# Patient Record
Sex: FEMALE | Race: WHITE | NOT HISPANIC OR LATINO | URBAN - METROPOLITAN AREA
[De-identification: names, ages, dates, MRNs, and addresses within clinical notes are randomized per-mention and may not be internally consistent; named-entity substitution may affect disease eponyms.]

---

## 2019-11-29 ENCOUNTER — EMERGENCY (EMERGENCY)
Facility: HOSPITAL | Age: 49
LOS: 0 days | Discharge: HOME | End: 2019-11-29
Attending: EMERGENCY MEDICINE
Payer: COMMERCIAL

## 2019-11-29 VITALS
HEART RATE: 83 BPM | TEMPERATURE: 98 F | SYSTOLIC BLOOD PRESSURE: 132 MMHG | RESPIRATION RATE: 18 BRPM | DIASTOLIC BLOOD PRESSURE: 69 MMHG | OXYGEN SATURATION: 97 %

## 2019-11-29 VITALS — HEIGHT: 68 IN | WEIGHT: 240.08 LBS

## 2019-11-29 DIAGNOSIS — L05.01 PILONIDAL CYST WITH ABSCESS: ICD-10-CM

## 2019-11-29 DIAGNOSIS — L72.9 FOLLICULAR CYST OF THE SKIN AND SUBCUTANEOUS TISSUE, UNSPECIFIED: ICD-10-CM

## 2019-11-29 PROCEDURE — 99283 EMERGENCY DEPT VISIT LOW MDM: CPT

## 2019-11-29 PROCEDURE — 99282 EMERGENCY DEPT VISIT SF MDM: CPT

## 2019-11-29 NOTE — ED PROVIDER NOTE - SKIN, MLM
Skin normal color for race, warm, dry and intact. No evidence of rash. Between upper buttox redness several cm blus elevation, induration, warmth and pain, no fluctuance, no significant drainage.

## 2019-11-29 NOTE — ED PROVIDER NOTE - PATIENT PORTAL LINK FT
You can access the FollowMyHealth Patient Portal offered by [facility] by registering at the following website: [email or web address]. By joining [facility]’s FollowMyHealth portal, you will also be able to view your health information using other applications (apps) compatible with our system.

## 2019-11-29 NOTE — ED PROVIDER NOTE - CLINICAL SUMMARY MEDICAL DECISION MAKING FREE TEXT BOX
Discuss options of labs vs treament only, pt prefers treatment, no incision at this point, will follow w PMD, we prescribe abx.

## 2019-11-29 NOTE — ED ADULT TRIAGE NOTE - CHIEF COMPLAINT QUOTE
c/o lower mid back abscess for past week, wound open and draining, c/o pain at site and associated chills last night, denies fever

## 2019-11-29 NOTE — ED PROVIDER NOTE - OBJECTIVE STATEMENT
Pt with pain lower back from cyst, had this for several years, now draining. chills yesterday, no fever. this has been going on for 1 week. Hx of defibrillator, MVP, CHF w pregnancy. Takes ASA. No other symtoms.

## 2019-11-29 NOTE — ED ADULT NURSE NOTE - OBJECTIVE STATEMENT
pt is a 49 y/o female presenting to ED for eval of abscess to the low back area w/ slight purulent drainage since last week now w/ chills last night. denies fever, dyspnea, palpitations or weakness.

## 2019-11-29 NOTE — ED ADULT NURSE NOTE - CHPI ED NUR SYMPTOMS NEG
no bleeding at site/no fever/no redness/no vomiting/no blood in mucus/no chills/no pain/no rectal pain

## 2022-07-26 ENCOUNTER — NON-APPOINTMENT (OUTPATIENT)
Age: 52
End: 2022-07-26

## 2022-07-26 ENCOUNTER — APPOINTMENT (OUTPATIENT)
Dept: HEART AND VASCULAR | Facility: CLINIC | Age: 52
End: 2022-07-26

## 2022-07-26 VITALS
OXYGEN SATURATION: 94 % | HEART RATE: 71 BPM | SYSTOLIC BLOOD PRESSURE: 107 MMHG | HEIGHT: 68 IN | WEIGHT: 261 LBS | BODY MASS INDEX: 39.56 KG/M2 | DIASTOLIC BLOOD PRESSURE: 73 MMHG

## 2022-07-26 PROBLEM — Z00.00 ENCOUNTER FOR PREVENTIVE HEALTH EXAMINATION: Status: ACTIVE | Noted: 2022-07-26

## 2022-07-26 PROCEDURE — 93000 ELECTROCARDIOGRAM COMPLETE: CPT | Mod: NC

## 2022-07-26 PROCEDURE — 99204 OFFICE O/P NEW MOD 45 MIN: CPT | Mod: 25

## 2022-07-26 NOTE — DISCUSSION/SUMMARY
[FreeTextEntry1] : 50 y/o F with PMHx of NICM (EF 45%), ICD, MR s/p Mitral valve repair (1999), Paroxysmal SVT, Peripartum CM, HTN\par \par # Preop Evaluation\par RCRI 0. Able to perform > 4 mets without cardiac symptoms\par No contraindication or further cardiac work up needed prior to urgent surgery\par Recommend continuing ASA, BB perioperatively\par Can hold Lasix, Lisinopril on day of surgery\par \par # HFrEF (NICM)\par EF 45%, continue GDMT (BB, ACEi). Can hold ACEi on day of surgery\par ICD in place\par \par # HTN\par BPs at goal continue current meds\par \par # Hx of Mitral valve repair [EKG obtained to assist in diagnosis and management of assessed problem(s)] : EKG obtained to assist in diagnosis and management of assessed problem(s)

## 2022-07-26 NOTE — HISTORY OF PRESENT ILLNESS
[FreeTextEntry1] : 52 y/o F with PMHx of NICM (EF 45%), ICD, MR s/p Mitral valve repair (1999), Paroxysmal SVT, Peripartum CM, HTN\par \par Had TKR 2021, now pre-operative evaluation for infected Rt Knee prosthesis\par \par No active cardiac symptoms\par Able to perform > 4 mets\par Recent trip to [place], no issues with exertion\par No Hx of CAD\par \par Echo 9/2020: EF 45%, mild RV dilation, Mitral annuloplasty ring in place, mild MR, no pHTN\par EKG 7/26/2022: NSR, HR 69, stable from prior\par

## 2022-08-26 ENCOUNTER — NON-APPOINTMENT (OUTPATIENT)
Age: 52
End: 2022-08-26

## 2022-08-29 ENCOUNTER — APPOINTMENT (OUTPATIENT)
Dept: SURGERY | Facility: CLINIC | Age: 52
End: 2022-08-29

## 2022-08-29 ENCOUNTER — RESULT REVIEW (OUTPATIENT)
Age: 52
End: 2022-08-29

## 2022-08-29 VITALS
BODY MASS INDEX: 39.56 KG/M2 | DIASTOLIC BLOOD PRESSURE: 70 MMHG | SYSTOLIC BLOOD PRESSURE: 117 MMHG | WEIGHT: 261 LBS | HEART RATE: 79 BPM | HEIGHT: 68 IN | TEMPERATURE: 97.6 F

## 2022-08-29 DIAGNOSIS — Z86.79 PERSONAL HISTORY OF OTHER DISEASES OF THE CIRCULATORY SYSTEM: ICD-10-CM

## 2022-08-29 DIAGNOSIS — Z86.19 PERSONAL HISTORY OF OTHER INFECTIOUS AND PARASITIC DISEASES: ICD-10-CM

## 2022-08-29 DIAGNOSIS — Z95.810 PRESENCE OF AUTOMATIC (IMPLANTABLE) CARDIAC DEFIBRILLATOR: ICD-10-CM

## 2022-08-29 DIAGNOSIS — Z87.19 PERSONAL HISTORY OF OTHER DISEASES OF THE DIGESTIVE SYSTEM: ICD-10-CM

## 2022-08-29 DIAGNOSIS — Z95.0 PRESENCE OF CARDIAC PACEMAKER: ICD-10-CM

## 2022-08-29 PROCEDURE — 11770 EXC PILONIDAL CYST SIMPLE: CPT

## 2022-08-29 PROCEDURE — 99203 OFFICE O/P NEW LOW 30 MIN: CPT | Mod: 25

## 2022-08-29 RX ORDER — CEFTRIAXONE SODIUM 1 G
VIAL (EA) INJECTION
Refills: 0 | Status: ACTIVE | COMMUNITY

## 2022-08-29 RX ORDER — CELECOXIB 50 MG/1
CAPSULE ORAL
Refills: 0 | Status: ACTIVE | COMMUNITY

## 2022-08-29 RX ORDER — PANTOPRAZOLE SODIUM 20 MG/1
TABLET, DELAYED RELEASE ORAL
Refills: 0 | Status: ACTIVE | COMMUNITY

## 2022-08-29 RX ORDER — FUROSEMIDE 80 MG/1
TABLET ORAL
Refills: 0 | Status: ACTIVE | COMMUNITY

## 2022-08-29 RX ORDER — CARVEDILOL 3.12 MG/1
TABLET, FILM COATED ORAL
Refills: 0 | Status: ACTIVE | COMMUNITY

## 2022-08-29 RX ORDER — RIFAMPIN 600 MG/10ML
INJECTION, POWDER, LYOPHILIZED, FOR SOLUTION INTRAVENOUS
Refills: 0 | Status: ACTIVE | COMMUNITY

## 2022-08-29 NOTE — HISTORY OF PRESENT ILLNESS
[de-identified] : 52 yo F with history for several months/years of recurrent cyst in the coccygeal area. On one occasional, it became quite inflamed and she needed incision and drainage at a local ER. SInce then, every few months, it flares but drains spontaneously and doesn't cause much issue. SHe recently had a knee replacement in November 2021, and about 6 months later would found to have an infection there. A source was not identified but idea was possibly that the pilonidal cyst could have contributed. The patient is s/p knee washout and is currently on IV abx via picc with plans for long-term po abx after. She denies it is not inflamed or tender now.

## 2022-08-29 NOTE — ASSESSMENT
[FreeTextEntry1] : 50 yo F with recurrent epidermal inclusion vs pilonidal cyst in the lower midline. \par \par We discussed excision to prevent recurrence.\par Given the uninflamed state of the cyst, I have suggest we attempt local excision with primary closure. Risks and benefits discussed. Patient in agreement to proceed.\par \par See procedure below\par Follow up in 10 days for wound check.

## 2022-08-29 NOTE — PLAN
[FreeTextEntry1] : After informed consent was signed by the patient and witnessed by the medical assistant, the area was prepped with Betadine 15cc f 1% lidocaine with epinephrine were used to anesthetize the skin. A 15 blade was then used to excise over the involved skin in an elliptical fashion. A cyst cavity was attempted to be identified but it was quite small and scarred. This tissue was dissected out laterally and deeply sharply to healthy surrounding subcutaneous tissue to ensure cyst was removed. Once the specimen was freed, it was sent labeled to pathology in [place]. The wound bed was inspected for hemostasis controlled with pressure as patient has an AICD and cautery was avoided for this purpose. Once hemostasis was adequate, the wound was closed in 2 layers with 3-0 and 4-0 Vicryl suture. The wound was dressed with steristrips, gauze and tegaderm. The patient tolerated the procedure well.

## 2022-08-29 NOTE — PHYSICAL EXAM
[Respiratory Effort] : normal respiratory effort [Normal Rate and Rhythm] : normal rate and rhythm [de-identified] : NAD, well-appearing [de-identified] : Anicteric [de-identified] : soft, nondistended  [de-identified] : small well-healed area with prior incision on the left aspect coccyx skin without any hair or visible pits. Non tender or draining. Evidence of small subcutaneous scarred cyst.

## 2022-08-30 ENCOUNTER — NON-APPOINTMENT (OUTPATIENT)
Age: 52
End: 2022-08-30

## 2022-09-07 ENCOUNTER — APPOINTMENT (OUTPATIENT)
Dept: SURGERY | Facility: CLINIC | Age: 52
End: 2022-09-07

## 2022-09-07 VITALS
SYSTOLIC BLOOD PRESSURE: 117 MMHG | DIASTOLIC BLOOD PRESSURE: 71 MMHG | WEIGHT: 261 LBS | HEART RATE: 87 BPM | BODY MASS INDEX: 39.56 KG/M2 | RESPIRATION RATE: 16 BRPM | HEIGHT: 68 IN

## 2022-09-07 DIAGNOSIS — L05.91 PILONIDAL CYST W/OUT ABSCESS: ICD-10-CM

## 2022-09-07 PROCEDURE — 99024 POSTOP FOLLOW-UP VISIT: CPT

## 2022-09-07 NOTE — HISTORY OF PRESENT ILLNESS
[de-identified] : 5 [de-identified] : Patient returns for wound check. Incision is healing well without dehiscence or erythema or drainage.

## 2022-09-07 NOTE — ASSESSMENT
[FreeTextEntry1] : 50 yo F s/p excision of recurrent pilonidal cyst. Recovering well with appropriately healing incision.\par \par Monitor for any symptoms recurrence. Follow up as needed.

## 2022-09-07 NOTE — PHYSICAL EXAM
[Respiratory Effort] : normal respiratory effort [Normal Rate and Rhythm] : normal rate and rhythm [de-identified] : NAD, well-appearing [de-identified] : well-healing incision over the sacral area. C/d/i

## 2022-11-16 ENCOUNTER — TRANSCRIPTION ENCOUNTER (OUTPATIENT)
Age: 52
End: 2022-11-16

## 2024-03-05 NOTE — DATA REVIEWED
Today you were diagnosed with strep throat we gave you your first dose of antibiotic here tonight and you will need to get the prescription filled  And take until gone.    [facility] or return to the ER if worse   [FreeTextEntry1] : Pathology reviewed with patient